# Patient Record
Sex: FEMALE | Race: WHITE
[De-identification: names, ages, dates, MRNs, and addresses within clinical notes are randomized per-mention and may not be internally consistent; named-entity substitution may affect disease eponyms.]

---

## 2018-02-01 ENCOUNTER — HOSPITAL ENCOUNTER (OUTPATIENT)
Dept: HOSPITAL 47 - RADCTMAIN | Age: 38
Discharge: HOME | End: 2018-02-01
Payer: COMMERCIAL

## 2018-02-01 DIAGNOSIS — R19.03: Primary | ICD-10-CM

## 2018-02-01 PROCEDURE — 74177 CT ABD & PELVIS W/CONTRAST: CPT

## 2018-02-01 NOTE — CT
EXAMINATION TYPE: CT abdomen pelvis w con

 

DATE OF EXAM: 2/1/2018

 

COMPARISON: NONE

 

HISTORY: RLQ pain

 

CT DLP: 951.7 mGycm

Automated exposure control for dose reduction was used.

 

TECHNIQUE:  Helical acquisition of images was performed from the lung bases through the pelvis.

 

CONTRAST: 

Performed with Oral Contrast and with IV Contrast, patient injected with 100ml mL of Omnipaque 300.

 

FINDINGS: 

 

Lung bases are clear of infiltrate. There is no pleural effusion. Heart size is normal.

 

Liver spleen pancreas gallbladder appear normal. Bile ducts are not dilated.

 

There is no adrenal mass. There is a 1 cm cyst on the upper pole left kidney. There is an 8 mm cyst i
n the posterior left kidney. There is no evidence of a solid renal mass. There is no hydronephrosis. 
Ureters are not dilated. Bladder distends smoothly. There is no evidence of a pelvic mass. I see no i
ntestinal wall thickening. There are no dilated loops.

 

There is no ascites. There is no sign of a pelvic mass. Uterus appears absent. I see no bony destruct
neda process. There is a 3.2 x 1.7 cm oval-shaped soft tissue density in the subcutaneous fat in the r
ight inguinal region. There is mild fat stranding around the density. I see no hernia defect. The ant
erior abdominal wall appears intact.

 

The bony structures appear intact.

IMPRESSION: 

SUBCUTANEOUS RIGHT LOWER QUADRANT SOFT TISSUE DENSITY MASS WITH SURROUNDING REACTIVE CHANGES. THE NAVNEET
EARANCE IS NONSPECIFIC. NO HERNIA SEEN.

 

 NO SIGNIFICANT DEMONSTRATED ABNORMALITY WITHIN THE ABDOMEN AND PELVIS.

## 2018-04-06 ENCOUNTER — HOSPITAL ENCOUNTER (OUTPATIENT)
Dept: HOSPITAL 47 - OR | Age: 38
Discharge: HOME | End: 2018-04-06
Payer: COMMERCIAL

## 2018-04-06 VITALS — HEART RATE: 81 BPM | SYSTOLIC BLOOD PRESSURE: 117 MMHG | DIASTOLIC BLOOD PRESSURE: 79 MMHG

## 2018-04-06 VITALS — RESPIRATION RATE: 18 BRPM

## 2018-04-06 VITALS — TEMPERATURE: 98 F

## 2018-04-06 VITALS — BODY MASS INDEX: 32 KG/M2

## 2018-04-06 DIAGNOSIS — Z88.0: ICD-10-CM

## 2018-04-06 DIAGNOSIS — F17.210: ICD-10-CM

## 2018-04-06 DIAGNOSIS — Z79.899: ICD-10-CM

## 2018-04-06 DIAGNOSIS — E66.9: ICD-10-CM

## 2018-04-06 DIAGNOSIS — N80.8: Primary | ICD-10-CM

## 2018-04-06 PROCEDURE — 88305 TISSUE EXAM BY PATHOLOGIST: CPT

## 2018-04-06 PROCEDURE — 22903 EXC ABD LES SC 3 CM/>: CPT

## 2018-04-06 RX ADMIN — POTASSIUM CHLORIDE SCH MLS: 14.9 INJECTION, SOLUTION INTRAVENOUS at 12:30

## 2018-04-06 RX ADMIN — POTASSIUM CHLORIDE SCH MLS: 14.9 INJECTION, SOLUTION INTRAVENOUS at 13:24

## 2018-04-06 RX ADMIN — MEPERIDINE HYDROCHLORIDE ONE MG: 50 INJECTION, SOLUTION INTRAMUSCULAR; INTRAVENOUS; SUBCUTANEOUS at 11:28

## 2018-04-06 RX ADMIN — POTASSIUM CHLORIDE SCH MLS: 14.9 INJECTION, SOLUTION INTRAVENOUS at 08:59

## 2018-04-06 RX ADMIN — MEPERIDINE HYDROCHLORIDE ONE MG: 50 INJECTION, SOLUTION INTRAMUSCULAR; INTRAVENOUS; SUBCUTANEOUS at 11:35

## 2018-04-06 NOTE — P.OP
Date of Procedure: 04/06/18


Description of Procedure: 








SURGEON:  LESA GARCIA MD


ASSISTANT:  NONE.


PREOPERATIVE DIAGNOSES:  


1.  Right lower quadrant abdominal mass.


2.  Obesity due to excess calories


3.  BMI 32.0


4.  History of tobacco abuse





POSTOPERATIVE DIAGNOSES:


1.  Right lower quadrant abdominal mass.


2.  Obesity due to excess calories


3.  BMI 32.0


4.  History of tobacco abuse





OPERATION:  


1. Excision of right lower quadrant abdominal wall deep subcutaneous tumor 

extending to the fascia 5 x 6 cm. 





ANESTHESIA: MAC with local


ESTIMATED BLOOD LOSS: 3 mL. 


SPECIMENS REMOVED: 


1. Abdominal wall mass, right lower quadrant 5 x 6 cm.


COMPLICATIONS: None.   





INDICATIONS: The patient is a 37-year-old female who presents with


right lower quadrant pain including increased abdominal wall tenderness and 

swelling.  Surgical


options, including excision was discussed. Benefits and risks were described. 

Informed consent was


obtained. 





DESCRIPTION OF PROCEDURE: Patient was brought into the operating room,


laid in supine position. After adequate IV sedation, the abdomen was


prepped and draped in standard sterile fashion using ChloraPrep. A


timeout protocol was confirmed with the surgical team regarding


patient's name including procedures to be performed. Preoperative


medications was administered.





Next, field block using local was administered. 


The abdominal mass was measured using a ruler with borders marked with 

indelible marker.  A soft tissue skin flap was developed after using #15 blade 

into the deep subcutaneous tissues to the fascia.  Using Allis clamps, the 

abdominal mass was palpated and dissected circumferentially using Bovie 

cautery.  The mass was excised to the level of the fascia.  Hemostasis was 

checked with electro-Bovie cautery.  The specimen was passed off and measured 

to be 5 x 6 cm. 





Next the wound with 3-0 Vicryl for the deep dermis in an interrupted


fashion and a running subcuticular suture of 4-0 Monocryl. 





Another layer using Dermabond tape and liquid was applied.





The skin was cleansed.  Optifoam dressing was placed. 





At the end of the procedure, needle, sponge, and instrument count had been 

verified


correct by the surgical technician. The patient was taken to the


postanesthesia care unit in stable condition.  





FINDINGS: 


1.  Abdominal wall mass involving the deep subcutaneous tissue  5 x 6 cm.





Plan - Discharge Summary


New Discharge Prescriptions: 


No Action


   Cetirizine HCl [Zyrtec] 10 mg PO DAILY


   Naproxen Sodium [Aleve] 220 mg PO BID PRN


     PRN Reason: Pain


Discharge Medication List





Cetirizine HCl [Zyrtec] 10 mg PO DAILY 04/02/18 [History]


Naproxen Sodium [Aleve] 220 mg PO BID PRN 04/02/18 [History]

## 2018-04-06 NOTE — P.GSHP
History of Present Illness


H&P Date: 18














CHIEF COMPLAINT:


Painful lesions along the right lower abdomen





HISTORY OF PRESENT ILLNESS:


The patient is a 37 year-old female with history of mass along the abdominal 

wall, right lower abdomen.  She presents today for surgical excision.





PAST MEDICAL HISTORY: 


Please see list.





PAST SURGICAL HISTORY: 


Please see list.





MEDICATIONS: 


Please see list.





ALLERGIES:  Please see list. 





SOCIAL HISTORY: No illicit drug use





FAMILY HISTORY: No reports of Crohn disease or ulcerative colitis. 





REVIEW OF ORGAN SYSTEMS: 


CONSTITUTIONAL: No reports of fevers or chills. 


GI:  Denies any blood in stools or constipation. 





PHYSICAL EXAM: 


VITAL SIGNS:  Stable


Musculoskeletal: No clubbing cyanosis or edema


SKIN: Mass along the abdominal wall, right lower


GENERAL: Well developed and in no acute distress. Pleasant.


HEENT: No sclera icterus. Extraocular movements grossly intact. Moist buccal 

mucosa. Head is atraumatic, normocephalic. Hears conversational speech. No 

nasal drainage.


NECK: Supple without lymphadenopathy. No JV distention.


CHEST: Non-labored respirations and equal bilateral excursions. 


CARDIOVASCULAR: Regular rate and rhythm. Palpable 2+ radial pulses.


ABDOMEN: Soft. Non-tender. Nondistended.


NEUROLOGIC: No focal or lateralizing signs. 


PSYCH: Appropriate affect. Alert and oriented to person, place and time.





STUDIES: 


CT reviewed





ASSESSMENT:


1.  Mass along abdominal wall, right lower abdomen 





PLAN:


1.  Will proceed of excision of subcutaneous tumors along the abdominal wall


2.  DVT prophylaxis.


3.  Antibiotic prophylaxis.


4. Time of recovery, at least one week. 





Past Medical History


Past Medical History: No Reported History


Additional Past Medical History / Comment(s): HAS SCREWS IN JAW. RLQ ABD MASS.


History of Any Multi-Drug Resistant Organisms: None Reported


Past Surgical History: Adenoidectomy,  Section, Hysterectomy, 

Tonsillectomy


Additional Past Surgical History / Comment(s): Jaw surgery.


Past Anesthesia/Blood Transfusion Reactions: No Reported Reaction


Smoking Status: Current every day smoker





- Past Family History


  ** Father


Family Medical History: Coronary Artery Disease (CAD)





Medications and Allergies


 Home Medications











 Medication  Instructions  Recorded  Confirmed  Type


 


Cetirizine HCl [Zyrtec] 10 mg PO DAILY 18 History


 


Naproxen Sodium [Aleve] 220 mg PO BID PRN 18 History











 Allergies











Allergy/AdvReac Type Severity Reaction Status Date / Time


 


Penicillins Allergy  Unknown Verified 18 16:13

## 2018-12-22 ENCOUNTER — HOSPITAL ENCOUNTER (EMERGENCY)
Dept: HOSPITAL 47 - EC | Age: 38
Discharge: HOME | End: 2018-12-22
Payer: COMMERCIAL

## 2018-12-22 VITALS
TEMPERATURE: 98 F | DIASTOLIC BLOOD PRESSURE: 96 MMHG | RESPIRATION RATE: 18 BRPM | HEART RATE: 104 BPM | SYSTOLIC BLOOD PRESSURE: 140 MMHG

## 2018-12-22 DIAGNOSIS — Z79.899: ICD-10-CM

## 2018-12-22 DIAGNOSIS — Z88.0: ICD-10-CM

## 2018-12-22 DIAGNOSIS — S46.001A: Primary | ICD-10-CM

## 2018-12-22 DIAGNOSIS — F17.200: ICD-10-CM

## 2018-12-22 PROCEDURE — 73030 X-RAY EXAM OF SHOULDER: CPT

## 2018-12-22 PROCEDURE — 99283 EMERGENCY DEPT VISIT LOW MDM: CPT

## 2018-12-22 PROCEDURE — 96372 THER/PROPH/DIAG INJ SC/IM: CPT

## 2018-12-22 NOTE — XR
EXAMINATION TYPE: XR shoulder complete RT

 

DATE OF EXAM: 12/22/2018

 

COMPARISON: NONE

 

HISTORY: 38-year-old female right shoulder pain after fall

 

TECHNIQUE: 3 views

 

FINDINGS: 

Mild degenerative spurring at the AC joint. Subacromial space is preserved. No acute fracture, sublux
ation, or dislocation. Small delineation to the greater tuberosity. Visualized right hemithorax is cl
ear.

 

 

 

IMPRESSION: 

No acute osseous abnormality seen.

## 2018-12-22 NOTE — ED
Upper Extremity HPI





- General


Chief Complaint: Extremity Injury, Upper


Stated Complaint: rt shoulder injury from fall, poss dislocation


Time Seen by Provider: 18 10:15


Source: patient


Mode of arrival: ambulatory


Limitations: no limitations





- History of Present Illness


Initial Comments: 


38-year-old female patient presents to the emergency department today for 

evaluation of right shoulder pain.  Patient states last evening she was bending 

forward to pick something up wearing high heels when she lost her balance and 

fell forward on an outstretched palm.  Patient states that the right arm took 

the brunt of the fall.  States that she did initially have shoulder pain but 

decided to go to bed and sleep it off.  States when she woke up this morning 

the pain in her shoulder significantly worse, states that she is unable to move 

it due to the pain.  States that she was having some tingling to her hands, 

states that this has improved somewhat.  States she has not taking anything for 

pain or discomfort.  She denies hitting her head or losing consciousness with 

the fall.  She denies any neck or back pain.  Denies any other injuries.  

Denies any history of injury to the shoulder. Patient denies any headache, 

chest pain, shortness of breath, dizziness, weakness, abdominal pain, nausea, 

vomiting, or difficulties with bowel movements or urination.





- Related Data


 Home Medications











 Medication  Instructions  Recorded  Confirmed


 


Cetirizine HCl [Zyrtec] 10 mg PO DAILY 18








 Previous Rx's











 Medication  Instructions  Recorded


 


Ibuprofen [Motrin] 600 mg PO Q8HR PRN #30 tab 18











 Allergies











Allergy/AdvReac Type Severity Reaction Status Date / Time


 


Penicillins Allergy  Unknown Verified 18 10:55














Review of Systems


ROS Statement: 


Those systems with pertinent positive or pertinent negative responses have been 

documented in the HPI.





ROS Other: All systems not noted in ROS Statement are negative.





Past Medical History


Past Medical History: No Reported History


Additional Past Medical History / Comment(s): HAS SCREWS IN JAW. RLQ ABD MASS.


History of Any Multi-Drug Resistant Organisms: None Reported


Past Surgical History: Adenoidectomy,  Section, Hysterectomy, 

Tonsillectomy


Additional Past Surgical History / Comment(s): Jaw surgery.


Past Anesthesia/Blood Transfusion Reactions: No Reported Reaction


Past Psychological History: No Psychological Hx Reported


Smoking Status: Current every day smoker


Past Alcohol Use History: Occasional


Past Drug Use History: None Reported





- Past Family History


  ** Father


Family Medical History: Coronary Artery Disease (CAD)





General Exam


Limitations: no limitations


General appearance: alert, in no apparent distress, other (This is a well-

developed, well-nourished adult female patient in no acute distress.  Vital 

signs upon presentation are temperature 98.0F, pulse 104, respirations 18, 

blood pressure 140/96, pulse ox 98% on room air.)


Eye exam: Present: normal appearance, PERRL, EOMI.  Absent: scleral icterus, 

conjunctival injection, periorbital swelling


ENT exam: Present: normal exam, normal oropharynx, mucous membranes moist


Neck exam: Present: normal inspection, full ROM, other (Nontender, no step-off, 

no deformity to firm midline palpation of the posterior cervical spine.  Full 

range of motion without pain or limitation.).  Absent: tenderness, meningismus, 

lymphadenopathy


Respiratory exam: Present: normal lung sounds bilaterally.  Absent: respiratory 

distress, wheezes, rales, rhonchi, stridor


Cardiovascular Exam: Present: regular rate, normal rhythm, normal heart sounds.

  Absent: systolic murmur, diastolic murmur, rubs, gallop, clicks


Extremities exam: Present: normal inspection, tenderness (Tenderness over the 

right acromioclavicular joint), normal capillary refill, other (Skin to the 

right upper extremities pink, warm, and dry.  Cap refills less than 3 seconds.  

Radial pulses 2+ and equal bilaterally.  See no evidence of deformity to the 

shoulder.).  Absent: full ROM (Limited range of motion to the right shoulder 

due to increased pain with movement), pedal edema, joint swelling, calf 

tenderness


Back exam: Present: normal inspection, other (Nontender, no step-off, no 

deformity to firm midline palpation of the thoracic and lumbar vertebrae. Full 

range of motion without pain or limitation.).  Absent: vertebral tenderness


Neurological exam: Present: alert, oriented X3, CN II-XII intact


Psychiatric exam: Present: normal affect, normal mood


Skin exam: Present: warm, dry, intact, normal color.  Absent: rash





Course


 Vital Signs











  18





  10:11


 


Temperature 98 F


 


Pulse Rate 104 H


 


Respiratory 18





Rate 


 


Blood Pressure 140/96


 


O2 Sat by Pulse 98





Oximetry 














Medical Decision Making





- Medical Decision Making


38-year-old female patient presents to the emergency department today for 

evaluation of right shoulder pain after expressing a fall last evening.  

Physical examination did reveal some tenderness over the acromioclavicular 

joint and increased pain with forward flexion and abduction.  Patient is unable 

to externally rotate the arm.  X-rays negative for any acute fractures or 

dislocation.  Chief concern is for rotator cuff injury.  She'll be discharged 

home with pain medication at this time.  She is instructed to follow-up with 

orthopedics for further evaluation as soon as possible.  Return parameters were 

discussed in detail.  She verbalizes understanding and agrees with this plan.








- Radiology Data


Radiology results: report reviewed, image reviewed


Three-view x-ray of the right shoulder obtained.  Report was reviewed in its 

entirety.  Impression by Dr. Escalante shows mild degenerative spurring at the 

before meals joint.  Subacromial space is preserved.  No acute fracture, 

subluxation, or dislocation.  Small delineation to the greater tuberosity.  

Visualized right hemithorax is clear.  Impression by Dr. Escalante shows no acute 

osseous abnormalities seen. 





Disposition


Clinical Impression: 


 Rotator cuff injury





Disposition: HOME SELF-CARE


Condition: Good


Instructions:  Rotator Cuff Injury (ED)


Additional Instructions: 


Remove sling and perform gentle range of motion to 3 times per day.  Take 

medications as directed.  Apply ice to the shoulder 20 minutes at a time at 

least 4 times daily.  Follow-up with orthopedics for recheck as soon as 

possible.  Return immediately for any new, worsening, or concerning symptoms.


Prescriptions: 


Ibuprofen [Motrin] 600 mg PO Q8HR PRN #30 tab


 PRN Reason: Pain


Is patient prescribed a controlled substance at d/c from ED?: No


Referrals: 


Marlyn Garcia DO [Primary Care Provider] - 1-2 days


Time of Disposition: 11:16

## 2022-02-24 ENCOUNTER — HOSPITAL ENCOUNTER (OUTPATIENT)
Dept: HOSPITAL 47 - RADMAMWWP | Age: 42
Discharge: HOME | End: 2022-02-24
Attending: FAMILY MEDICINE
Payer: COMMERCIAL

## 2022-02-24 DIAGNOSIS — Z12.31: Primary | ICD-10-CM

## 2022-02-24 PROCEDURE — 77067 SCR MAMMO BI INCL CAD: CPT

## 2022-02-28 NOTE — MM
Reason for exam: screening  (asymptomatic).

Baseline mammogram.



Physical Findings:

A clinical breast exam by your physician is recommended on an annual basis and 

results should be correlated with mammographic findings.



MG Screening Mammo w CAD

Bilateral CC and MLO view(s) were taken.

There are scattered fibroglandular densities.  There is no discrete abnormality.





ASSESSMENT: Benign, BI-RAD 2



RECOMMENDATION:

Routine screening mammogram of both breasts in 1 year.